# Patient Record
Sex: FEMALE | Race: WHITE
[De-identification: names, ages, dates, MRNs, and addresses within clinical notes are randomized per-mention and may not be internally consistent; named-entity substitution may affect disease eponyms.]

---

## 2019-08-14 ENCOUNTER — HOSPITAL ENCOUNTER (OUTPATIENT)
Dept: HOSPITAL 92 - RAD | Age: 34
Discharge: HOME | End: 2019-08-14
Attending: OBSTETRICS & GYNECOLOGY
Payer: COMMERCIAL

## 2019-08-14 DIAGNOSIS — N97.9: Primary | ICD-10-CM

## 2019-08-14 DIAGNOSIS — R93.89: ICD-10-CM

## 2019-08-14 PROCEDURE — 74740 X-RAY FEMALE GENITAL TRACT: CPT

## 2019-08-14 PROCEDURE — 58340 CATHETER FOR HYSTEROGRAPHY: CPT

## 2019-08-14 NOTE — RAD
Exam: Hysterosalpingogram



HISTORY: Infertility. Difficulty getting pregnant.

Exposure: 2 minutes, 861.7 mcg/sq m



FINDINGS: Contrast was administered in a retrograde fashion. The endometrial canal is opacified and t
here appears to be a filling defect along the right fourth of the endometrium. Etiology is likely

within the uterine myometrium and external to the endometrium. This filling defect is incompletely ev
aluated and better interrogation with pelvic MRI is recommended. The left and right fallopian tube

opacified and there is free spillage in the left and right hemipelvis. Lower uterine segment is gross
ly unremarkable



IMPRESSION:

1. Patent left and right fallopian tube.

2. Filling defect along the right aspect of the endometrium, likely due to a myometrial process. Cons
ider uterine leiomyoma versus a polyp. Better interrogation with pelvic MRI is recommended

A message was left via [employer] for Dr Somers 8/15/2019 at 9:46 AM.

Code CR



Transcribed Date/Time: 8/14/2019 6:36 PM



Reported By: Tisha Barnes 

Electronically Signed:  8/15/2019 9:51 AM